# Patient Record
Sex: FEMALE
[De-identification: names, ages, dates, MRNs, and addresses within clinical notes are randomized per-mention and may not be internally consistent; named-entity substitution may affect disease eponyms.]

---

## 2017-01-27 ENCOUNTER — APPOINTMENT (OUTPATIENT)
Dept: OTOLARYNGOLOGY | Facility: CLINIC | Age: 57
End: 2017-01-27

## 2017-04-05 ENCOUNTER — APPOINTMENT (OUTPATIENT)
Dept: OTOLARYNGOLOGY | Facility: CLINIC | Age: 57
End: 2017-04-05

## 2017-04-24 ENCOUNTER — APPOINTMENT (OUTPATIENT)
Dept: OTOLARYNGOLOGY | Facility: CLINIC | Age: 57
End: 2017-04-24

## 2017-04-24 VITALS — DIASTOLIC BLOOD PRESSURE: 105 MMHG | HEART RATE: 135 BPM | SYSTOLIC BLOOD PRESSURE: 136 MMHG | OXYGEN SATURATION: 91 %

## 2017-04-24 DIAGNOSIS — K13.0 DISEASES OF LIPS: ICD-10-CM

## 2017-04-24 RX ORDER — FLUTICASONE PROPIONATE 50 UG/1
50 SPRAY, METERED NASAL DAILY
Qty: 1 | Refills: 6 | Status: ACTIVE | COMMUNITY
Start: 2017-04-24 | End: 1900-01-01

## 2017-04-24 RX ORDER — MUPIROCIN 20 MG/G
2 OINTMENT TOPICAL 3 TIMES DAILY
Qty: 1 | Refills: 3 | Status: ACTIVE | COMMUNITY
Start: 2017-04-24 | End: 1900-01-01

## 2017-07-20 ENCOUNTER — RESULT REVIEW (OUTPATIENT)
Age: 57
End: 2017-07-20

## 2017-07-21 ENCOUNTER — OUTPATIENT (OUTPATIENT)
Dept: OUTPATIENT SERVICES | Facility: HOSPITAL | Age: 57
LOS: 1 days | End: 2017-07-21
Payer: COMMERCIAL

## 2017-07-21 DIAGNOSIS — N64.81 PTOSIS OF BREAST: ICD-10-CM

## 2017-07-21 PROCEDURE — 88305 TISSUE EXAM BY PATHOLOGIST: CPT

## 2017-07-27 LAB — SURGICAL PATHOLOGY STUDY: SIGNIFICANT CHANGE UP

## 2017-10-12 ENCOUNTER — OUTPATIENT (OUTPATIENT)
Dept: OUTPATIENT SERVICES | Facility: HOSPITAL | Age: 57
LOS: 1 days | End: 2017-10-12
Payer: COMMERCIAL

## 2017-10-12 DIAGNOSIS — Z01.812 ENCOUNTER FOR PREPROCEDURAL LABORATORY EXAMINATION: ICD-10-CM

## 2017-10-12 LAB
ANION GAP SERPL CALC-SCNC: 15 MMOL/L — SIGNIFICANT CHANGE UP (ref 5–17)
BASOPHILS NFR BLD AUTO: 0.1 % — SIGNIFICANT CHANGE UP (ref 0–2)
BUN SERPL-MCNC: 7 MG/DL — SIGNIFICANT CHANGE UP (ref 7–23)
CALCIUM SERPL-MCNC: 8.7 MG/DL — SIGNIFICANT CHANGE UP (ref 8.4–10.5)
CHLORIDE SERPL-SCNC: 105 MMOL/L — SIGNIFICANT CHANGE UP (ref 96–108)
CO2 SERPL-SCNC: 23 MMOL/L — SIGNIFICANT CHANGE UP (ref 22–31)
CREAT SERPL-MCNC: 0.54 MG/DL — SIGNIFICANT CHANGE UP (ref 0.5–1.3)
GLUCOSE SERPL-MCNC: 99 MG/DL — SIGNIFICANT CHANGE UP (ref 70–99)
HCT VFR BLD CALC: 38.9 % — SIGNIFICANT CHANGE UP (ref 34.5–45)
HGB BLD-MCNC: 13 G/DL — SIGNIFICANT CHANGE UP (ref 11.5–15.5)
LYMPHOCYTES # BLD AUTO: 9.7 % — LOW (ref 13–44)
MCHC RBC-ENTMCNC: 29.6 PG — SIGNIFICANT CHANGE UP (ref 27–34)
MCHC RBC-ENTMCNC: 33.4 G/DL — SIGNIFICANT CHANGE UP (ref 32–36)
MCV RBC AUTO: 88.6 FL — SIGNIFICANT CHANGE UP (ref 80–100)
MONOCYTES NFR BLD AUTO: 8.6 % — SIGNIFICANT CHANGE UP (ref 2–14)
NEUTROPHILS NFR BLD AUTO: 81.6 % — HIGH (ref 43–77)
PLATELET # BLD AUTO: 256 K/UL — SIGNIFICANT CHANGE UP (ref 150–400)
POTASSIUM SERPL-MCNC: 4.2 MMOL/L — SIGNIFICANT CHANGE UP (ref 3.5–5.3)
POTASSIUM SERPL-SCNC: 4.2 MMOL/L — SIGNIFICANT CHANGE UP (ref 3.5–5.3)
RBC # BLD: 4.39 M/UL — SIGNIFICANT CHANGE UP (ref 3.8–5.2)
RBC # FLD: 12.6 % — SIGNIFICANT CHANGE UP (ref 10.3–16.9)
SODIUM SERPL-SCNC: 143 MMOL/L — SIGNIFICANT CHANGE UP (ref 135–145)
WBC # BLD: 16.6 K/UL — HIGH (ref 3.8–10.5)
WBC # FLD AUTO: 16.6 K/UL — HIGH (ref 3.8–10.5)

## 2017-10-12 PROCEDURE — 85025 COMPLETE CBC W/AUTO DIFF WBC: CPT

## 2017-10-12 PROCEDURE — 80048 BASIC METABOLIC PNL TOTAL CA: CPT

## 2018-04-30 ENCOUNTER — APPOINTMENT (OUTPATIENT)
Dept: OTOLARYNGOLOGY | Facility: CLINIC | Age: 58
End: 2018-04-30
Payer: COMMERCIAL

## 2018-04-30 PROCEDURE — 99213 OFFICE O/P EST LOW 20 MIN: CPT | Mod: 25

## 2018-04-30 PROCEDURE — 69210 REMOVE IMPACTED EAR WAX UNI: CPT

## 2018-06-21 ENCOUNTER — APPOINTMENT (OUTPATIENT)
Dept: OTOLARYNGOLOGY | Facility: CLINIC | Age: 58
End: 2018-06-21

## 2020-02-19 ENCOUNTER — INPATIENT (INPATIENT)
Facility: HOSPITAL | Age: 60
LOS: 0 days | Discharge: HOME | DRG: 446 | End: 2020-02-20
Attending: SURGERY | Admitting: SURGERY
Payer: COMMERCIAL

## 2020-02-19 VITALS
RESPIRATION RATE: 17 BRPM | DIASTOLIC BLOOD PRESSURE: 87 MMHG | SYSTOLIC BLOOD PRESSURE: 122 MMHG | TEMPERATURE: 97 F | HEART RATE: 110 BPM | HEIGHT: 66 IN | WEIGHT: 119.05 LBS | OXYGEN SATURATION: 96 %

## 2020-02-19 LAB
ALBUMIN SERPL ELPH-MCNC: 4.1 G/DL — SIGNIFICANT CHANGE UP (ref 3.3–5)
ALP SERPL-CCNC: 74 U/L — SIGNIFICANT CHANGE UP (ref 40–120)
ALT FLD-CCNC: 16 U/L — SIGNIFICANT CHANGE UP (ref 10–45)
ANION GAP SERPL CALC-SCNC: 14 MMOL/L — SIGNIFICANT CHANGE UP (ref 5–17)
APTT BLD: 29.8 SEC — SIGNIFICANT CHANGE UP (ref 27.5–36.3)
AST SERPL-CCNC: 18 U/L — SIGNIFICANT CHANGE UP (ref 10–40)
BASOPHILS # BLD AUTO: 0.06 K/UL — SIGNIFICANT CHANGE UP (ref 0–0.2)
BASOPHILS # BLD AUTO: 0.07 K/UL — SIGNIFICANT CHANGE UP (ref 0–0.2)
BASOPHILS NFR BLD AUTO: 0.4 % — SIGNIFICANT CHANGE UP (ref 0–2)
BASOPHILS NFR BLD AUTO: 0.5 % — SIGNIFICANT CHANGE UP (ref 0–2)
BILIRUB SERPL-MCNC: 0.5 MG/DL — SIGNIFICANT CHANGE UP (ref 0.2–1.2)
BUN SERPL-MCNC: 16 MG/DL — SIGNIFICANT CHANGE UP (ref 7–23)
CALCIUM SERPL-MCNC: 8.6 MG/DL — SIGNIFICANT CHANGE UP (ref 8.4–10.5)
CHLORIDE SERPL-SCNC: 99 MMOL/L — SIGNIFICANT CHANGE UP (ref 96–108)
CO2 SERPL-SCNC: 23 MMOL/L — SIGNIFICANT CHANGE UP (ref 22–31)
CREAT SERPL-MCNC: 0.79 MG/DL — SIGNIFICANT CHANGE UP (ref 0.5–1.3)
EOSINOPHIL # BLD AUTO: 0.38 K/UL — SIGNIFICANT CHANGE UP (ref 0–0.5)
EOSINOPHIL # BLD AUTO: 0.5 K/UL — SIGNIFICANT CHANGE UP (ref 0–0.5)
EOSINOPHIL NFR BLD AUTO: 2.3 % — SIGNIFICANT CHANGE UP (ref 0–6)
EOSINOPHIL NFR BLD AUTO: 3.3 % — SIGNIFICANT CHANGE UP (ref 0–6)
GLUCOSE SERPL-MCNC: 124 MG/DL — HIGH (ref 70–99)
HCT VFR BLD CALC: 41.5 % — SIGNIFICANT CHANGE UP (ref 34.5–45)
HCT VFR BLD CALC: 48.2 % — HIGH (ref 34.5–45)
HGB BLD-MCNC: 14.4 G/DL — SIGNIFICANT CHANGE UP (ref 11.5–15.5)
HGB BLD-MCNC: 16.8 G/DL — HIGH (ref 11.5–15.5)
IMM GRANULOCYTES NFR BLD AUTO: 0.4 % — SIGNIFICANT CHANGE UP (ref 0–1.5)
IMM GRANULOCYTES NFR BLD AUTO: 0.5 % — SIGNIFICANT CHANGE UP (ref 0–1.5)
INR BLD: 1.04 — SIGNIFICANT CHANGE UP (ref 0.88–1.16)
LIDOCAIN IGE QN: 35 U/L — SIGNIFICANT CHANGE UP (ref 7–60)
LYMPHOCYTES # BLD AUTO: 14 % — SIGNIFICANT CHANGE UP (ref 13–44)
LYMPHOCYTES # BLD AUTO: 2.34 K/UL — SIGNIFICANT CHANGE UP (ref 1–3.3)
LYMPHOCYTES # BLD AUTO: 21.8 % — SIGNIFICANT CHANGE UP (ref 13–44)
LYMPHOCYTES # BLD AUTO: 3.35 K/UL — HIGH (ref 1–3.3)
MCHC RBC-ENTMCNC: 30.3 PG — SIGNIFICANT CHANGE UP (ref 27–34)
MCHC RBC-ENTMCNC: 30.7 PG — SIGNIFICANT CHANGE UP (ref 27–34)
MCHC RBC-ENTMCNC: 34.7 GM/DL — SIGNIFICANT CHANGE UP (ref 32–36)
MCHC RBC-ENTMCNC: 34.9 GM/DL — SIGNIFICANT CHANGE UP (ref 32–36)
MCV RBC AUTO: 87.4 FL — SIGNIFICANT CHANGE UP (ref 80–100)
MCV RBC AUTO: 88.1 FL — SIGNIFICANT CHANGE UP (ref 80–100)
MONOCYTES # BLD AUTO: 1.14 K/UL — HIGH (ref 0–0.9)
MONOCYTES # BLD AUTO: 1.18 K/UL — HIGH (ref 0–0.9)
MONOCYTES NFR BLD AUTO: 7.1 % — SIGNIFICANT CHANGE UP (ref 2–14)
MONOCYTES NFR BLD AUTO: 7.4 % — SIGNIFICANT CHANGE UP (ref 2–14)
NEUTROPHILS # BLD AUTO: 10.25 K/UL — HIGH (ref 1.8–7.4)
NEUTROPHILS # BLD AUTO: 12.7 K/UL — HIGH (ref 1.8–7.4)
NEUTROPHILS NFR BLD AUTO: 66.5 % — SIGNIFICANT CHANGE UP (ref 43–77)
NEUTROPHILS NFR BLD AUTO: 75.8 % — SIGNIFICANT CHANGE UP (ref 43–77)
NRBC # BLD: 0 /100 WBCS — SIGNIFICANT CHANGE UP (ref 0–0)
NRBC # BLD: 0 /100 WBCS — SIGNIFICANT CHANGE UP (ref 0–0)
PLATELET # BLD AUTO: 338 K/UL — SIGNIFICANT CHANGE UP (ref 150–400)
PLATELET # BLD AUTO: 424 K/UL — HIGH (ref 150–400)
POTASSIUM SERPL-MCNC: 4.4 MMOL/L — SIGNIFICANT CHANGE UP (ref 3.5–5.3)
POTASSIUM SERPL-SCNC: 4.4 MMOL/L — SIGNIFICANT CHANGE UP (ref 3.5–5.3)
PROT SERPL-MCNC: 6.9 G/DL — SIGNIFICANT CHANGE UP (ref 6–8.3)
PROTHROM AB SERPL-ACNC: 11.9 SEC — SIGNIFICANT CHANGE UP (ref 10–12.9)
RBC # BLD: 4.75 M/UL — SIGNIFICANT CHANGE UP (ref 3.8–5.2)
RBC # BLD: 5.47 M/UL — HIGH (ref 3.8–5.2)
RBC # FLD: 12.5 % — SIGNIFICANT CHANGE UP (ref 10.3–14.5)
RBC # FLD: 12.5 % — SIGNIFICANT CHANGE UP (ref 10.3–14.5)
SODIUM SERPL-SCNC: 136 MMOL/L — SIGNIFICANT CHANGE UP (ref 135–145)
WBC # BLD: 15.48 K/UL — HIGH (ref 3.8–10.5)
WBC # BLD: 16.73 K/UL — HIGH (ref 3.8–10.5)
WBC # FLD AUTO: 15.48 K/UL — HIGH (ref 3.8–10.5)
WBC # FLD AUTO: 16.73 K/UL — HIGH (ref 3.8–10.5)

## 2020-02-19 PROCEDURE — 99285 EMERGENCY DEPT VISIT HI MDM: CPT

## 2020-02-19 PROCEDURE — 74177 CT ABD & PELVIS W/CONTRAST: CPT | Mod: 26

## 2020-02-19 PROCEDURE — 76705 ECHO EXAM OF ABDOMEN: CPT | Mod: 26

## 2020-02-19 RX ORDER — ENOXAPARIN SODIUM 100 MG/ML
40 INJECTION SUBCUTANEOUS EVERY 24 HOURS
Refills: 0 | Status: DISCONTINUED | OUTPATIENT
Start: 2020-02-19 | End: 2020-02-20

## 2020-02-19 RX ORDER — METRONIDAZOLE 500 MG
500 TABLET ORAL ONCE
Refills: 0 | Status: COMPLETED | OUTPATIENT
Start: 2020-02-19 | End: 2020-02-19

## 2020-02-19 RX ORDER — METRONIDAZOLE 500 MG
TABLET ORAL
Refills: 0 | Status: DISCONTINUED | OUTPATIENT
Start: 2020-02-19 | End: 2020-02-20

## 2020-02-19 RX ORDER — SODIUM CHLORIDE 9 MG/ML
1000 INJECTION INTRAMUSCULAR; INTRAVENOUS; SUBCUTANEOUS ONCE
Refills: 0 | Status: COMPLETED | OUTPATIENT
Start: 2020-02-19 | End: 2020-02-19

## 2020-02-19 RX ORDER — SUMATRIPTAN SUCCINATE 4 MG/.5ML
50 INJECTION, SOLUTION SUBCUTANEOUS DAILY
Refills: 0 | Status: DISCONTINUED | OUTPATIENT
Start: 2020-02-19 | End: 2020-02-20

## 2020-02-19 RX ORDER — HYDROMORPHONE HYDROCHLORIDE 2 MG/ML
1 INJECTION INTRAMUSCULAR; INTRAVENOUS; SUBCUTANEOUS ONCE
Refills: 0 | Status: DISCONTINUED | OUTPATIENT
Start: 2020-02-19 | End: 2020-02-19

## 2020-02-19 RX ORDER — ONDANSETRON 8 MG/1
4 TABLET, FILM COATED ORAL ONCE
Refills: 0 | Status: COMPLETED | OUTPATIENT
Start: 2020-02-19 | End: 2020-02-19

## 2020-02-19 RX ORDER — METRONIDAZOLE 500 MG
500 TABLET ORAL EVERY 8 HOURS
Refills: 0 | Status: DISCONTINUED | OUTPATIENT
Start: 2020-02-20 | End: 2020-02-20

## 2020-02-19 RX ORDER — INFLUENZA VIRUS VACCINE 15; 15; 15; 15 UG/.5ML; UG/.5ML; UG/.5ML; UG/.5ML
0.5 SUSPENSION INTRAMUSCULAR ONCE
Refills: 0 | Status: DISCONTINUED | OUTPATIENT
Start: 2020-02-19 | End: 2020-02-20

## 2020-02-19 RX ORDER — IOHEXOL 300 MG/ML
30 INJECTION, SOLUTION INTRAVENOUS ONCE
Refills: 0 | Status: COMPLETED | OUTPATIENT
Start: 2020-02-19 | End: 2020-02-19

## 2020-02-19 RX ORDER — SODIUM CHLORIDE 9 MG/ML
1000 INJECTION, SOLUTION INTRAVENOUS
Refills: 0 | Status: DISCONTINUED | OUTPATIENT
Start: 2020-02-19 | End: 2020-02-20

## 2020-02-19 RX ORDER — CEFTRIAXONE 500 MG/1
1000 INJECTION, POWDER, FOR SOLUTION INTRAMUSCULAR; INTRAVENOUS EVERY 24 HOURS
Refills: 0 | Status: DISCONTINUED | OUTPATIENT
Start: 2020-02-19 | End: 2020-02-20

## 2020-02-19 RX ORDER — METOCLOPRAMIDE HCL 10 MG
10 TABLET ORAL ONCE
Refills: 0 | Status: COMPLETED | OUTPATIENT
Start: 2020-02-19 | End: 2020-02-19

## 2020-02-19 RX ORDER — BUPROPION HYDROCHLORIDE 150 MG/1
150 TABLET, EXTENDED RELEASE ORAL DAILY
Refills: 0 | Status: DISCONTINUED | OUTPATIENT
Start: 2020-02-19 | End: 2020-02-20

## 2020-02-19 RX ORDER — SODIUM CHLORIDE 9 MG/ML
1000 INJECTION INTRAMUSCULAR; INTRAVENOUS; SUBCUTANEOUS
Refills: 0 | Status: DISCONTINUED | OUTPATIENT
Start: 2020-02-19 | End: 2020-02-19

## 2020-02-19 RX ADMIN — SUMATRIPTAN SUCCINATE 50 MILLIGRAM(S): 4 INJECTION, SOLUTION SUBCUTANEOUS at 23:59

## 2020-02-19 RX ADMIN — HYDROMORPHONE HYDROCHLORIDE 1 MILLIGRAM(S): 2 INJECTION INTRAMUSCULAR; INTRAVENOUS; SUBCUTANEOUS at 16:14

## 2020-02-19 RX ADMIN — SODIUM CHLORIDE 1000 MILLILITER(S): 9 INJECTION INTRAMUSCULAR; INTRAVENOUS; SUBCUTANEOUS at 17:17

## 2020-02-19 RX ADMIN — ONDANSETRON 4 MILLIGRAM(S): 8 TABLET, FILM COATED ORAL at 10:50

## 2020-02-19 RX ADMIN — HYDROMORPHONE HYDROCHLORIDE 1 MILLIGRAM(S): 2 INJECTION INTRAMUSCULAR; INTRAVENOUS; SUBCUTANEOUS at 15:13

## 2020-02-19 RX ADMIN — HYDROMORPHONE HYDROCHLORIDE 1 MILLIGRAM(S): 2 INJECTION INTRAMUSCULAR; INTRAVENOUS; SUBCUTANEOUS at 10:50

## 2020-02-19 RX ADMIN — BUPROPION HYDROCHLORIDE 150 MILLIGRAM(S): 150 TABLET, EXTENDED RELEASE ORAL at 23:53

## 2020-02-19 RX ADMIN — SODIUM CHLORIDE 1000 MILLILITER(S): 9 INJECTION INTRAMUSCULAR; INTRAVENOUS; SUBCUTANEOUS at 14:52

## 2020-02-19 RX ADMIN — SODIUM CHLORIDE 100 MILLILITER(S): 9 INJECTION, SOLUTION INTRAVENOUS at 17:43

## 2020-02-19 RX ADMIN — Medication 104 MILLIGRAM(S): at 15:11

## 2020-02-19 RX ADMIN — HYDROMORPHONE HYDROCHLORIDE 1 MILLIGRAM(S): 2 INJECTION INTRAMUSCULAR; INTRAVENOUS; SUBCUTANEOUS at 16:34

## 2020-02-19 RX ADMIN — HYDROMORPHONE HYDROCHLORIDE 1 MILLIGRAM(S): 2 INJECTION INTRAMUSCULAR; INTRAVENOUS; SUBCUTANEOUS at 18:21

## 2020-02-19 RX ADMIN — HYDROMORPHONE HYDROCHLORIDE 1 MILLIGRAM(S): 2 INJECTION INTRAMUSCULAR; INTRAVENOUS; SUBCUTANEOUS at 19:31

## 2020-02-19 RX ADMIN — HYDROMORPHONE HYDROCHLORIDE 1 MILLIGRAM(S): 2 INJECTION INTRAMUSCULAR; INTRAVENOUS; SUBCUTANEOUS at 11:20

## 2020-02-19 RX ADMIN — IOHEXOL 30 MILLILITER(S): 300 INJECTION, SOLUTION INTRAVENOUS at 10:49

## 2020-02-19 RX ADMIN — HYDROMORPHONE HYDROCHLORIDE 1 MILLIGRAM(S): 2 INJECTION INTRAMUSCULAR; INTRAVENOUS; SUBCUTANEOUS at 19:22

## 2020-02-19 RX ADMIN — Medication 100 MILLIGRAM(S): at 23:01

## 2020-02-19 RX ADMIN — SUMATRIPTAN SUCCINATE 50 MILLIGRAM(S): 4 INJECTION, SOLUTION SUBCUTANEOUS at 22:59

## 2020-02-19 RX ADMIN — ENOXAPARIN SODIUM 40 MILLIGRAM(S): 100 INJECTION SUBCUTANEOUS at 18:31

## 2020-02-19 RX ADMIN — CEFTRIAXONE 100 MILLIGRAM(S): 500 INJECTION, POWDER, FOR SOLUTION INTRAMUSCULAR; INTRAVENOUS at 23:51

## 2020-02-19 NOTE — ED ADULT NURSE REASSESSMENT NOTE - NS ED NURSE REASSESS COMMENT FT1
pt refusing lactate draw as she does not want another blood draw. md mckeon aware, not needed at this time. no new orders.

## 2020-02-19 NOTE — ED PROVIDER NOTE - OBJECTIVE STATEMENT
60 y/o F with PMHx chronic constipation and chronic neck pain, on oxycodone, BIBA, reports several days of worsening abdominal pain, lack of appetite, constipation and nausea; no associated urinary complaints or vomiting. Pt reports normal colonoscopy 4 years ago and had a colonic, which she regularly has, 1 week ago.

## 2020-02-19 NOTE — H&P ADULT - HISTORY OF PRESENT ILLNESS
HPI: 59 F PMH breast ca s/p mastectomy and chemoRT, s/p TRAM flap (20yr ago), hx of cholelithiasis and nephrolithiasis managed medically w/ Bear Bile, presenting with two days of abdominal pain, nausea and emesis. Pt reports having a "bad hamburger" 3 days ago after which she noticed moderate achey intermittent periumbilical pain, lasting a few min to hrs and self-resolving, associated with mild nausea and one episode of small bilious emesis yesterday. Pt reports symptoms improved significantly after emesis but restarted overnight, therefore prompting pt to return to ED. She reports pain feels improved today and described as 6/10 achey umbilical pain which last for 15-20 min and occurring every hour. Denies fever/chills, denies chest pain/dyspnea, does not recall last time she passed flatus, last BM 2 days ago, reports previously having one BM daily. Voiding well without dysuria/pyuria. Denies personal/fam hx of IBD, last cscope/EGD 4 yrs ago and both normal per pt.     PMH: as above  PSH: as above  Meds: Metoprolol daily (pt unsure of dose, describes taking it for hx of palpitations in past), Oxy 15 mg 4x daily for chronic R sided neck pain  Social: Denies smoking, denies etoh use, denies illicit drug use, works on [employer]  Allergies: shellfish

## 2020-02-19 NOTE — H&P ADULT - ASSESSMENT
A/P: 59 F PMH breast ca s/p mastectomy and chemoRT, s/p TRAM flap (20yr ago), hx of cholelithiasis and nephrolithiasis managed medically w/ Bear Bile, presenting with two days of abdominal pain, nausea and emesis w/ GB distention and possible SBO on CT scan.    Admit to Dr. Cordova, regional  NPO/IVF  Repeat CBC at 6 pm after IV hydration, will reassess need for IV Ab at that time  Lovenox/SCDs for DVT ppx  Am labs  Discussed w/chief resident and Dr. Cordova A/P: 59 F PMH breast ca s/p mastectomy and chemoRT, s/p TRAM flap (20yr ago), hx of cholelithiasis and nephrolithiasis managed medically w/ Bear Bile, presenting with two days of abdominal pain, nausea and emesis w/ GB distention and possible ileus vs SBO on CT scan.    Admit to Dr. Cordova, regional  NPO/IVF  Repeat CBC at 6 pm after IV hydration, will reassess need for IV Ab at that time  Lovenox/SCDs for DVT ppx  Am labs  Discussed w/chief resident and Dr. Cordova

## 2020-02-19 NOTE — H&P ADULT - DOES THIS PATIENT HAVE A HISTORY OF OR HAS BEEN DX WITH HEART FAILURE?
Telephone Encounter by Maury Dawson RN, BSN at 05/09/17 11:31 AM     Author:  Maury Dawson RN, BSN Service:  (none) Author Type:  Registered Nurse     Filed:  05/09/17 11:31 AM Encounter Date:  5/8/2017 Status:  Signed     :  Maury Dawson RN, BSN (Registered Nurse)            Rx sent to pharmacy.  Patient already notified of labs via [employer] and lab orders previously placed[JH1.1M]      Revision History        User Key Date/Time User Provider Type Action    > JH1.1 05/09/17 11:31 AM Maury Dawson RN, BSN Registered Nurse Sign    M - Manual no

## 2020-02-19 NOTE — ED PROVIDER NOTE - PROGRESS NOTE DETAILS
will hold on antibiotics for possible cholecystitis at this time as per surgery recommendations, if suspician increases later they will start

## 2020-02-19 NOTE — CHART NOTE - NSCHARTNOTEFT_GEN_A_CORE
serial abdominal exam   patient complains of continued abdominal pain. IV pain medication given 3 hours ago helped the pain. pain rated 6/10, no associated factors with the pain. nothing makes pain worse, pain medication makes it better. denies nausea/ vomiting, +flatus, no BM  Physical exam:   soft, TTP in RUQ and RLQ, negative murphys sign

## 2020-02-19 NOTE — ED ADULT NURSE NOTE - ABDOMEN
tender/firm/nondistended
no abdominal pain, no bloating, no constipation, no diarrhea, no nausea and no vomiting.

## 2020-02-19 NOTE — ED ADULT NURSE NOTE - CAS TRG GENERAL NORM CIRC DET
Please schedule patient for Annual and Labs (CMP/LIPID)    Please also check with your provider if any further labs need to be added and scheduled together.    Thanks !   Strong peripheral pulses

## 2020-02-19 NOTE — ED PROVIDER NOTE - MUSCULOSKELETAL, MLM
How Severe Is Your Condition?: mild Spine appears normal, range of motion is not limited, no muscle or joint tenderness

## 2020-02-19 NOTE — H&P ADULT - NSHPLABSRESULTS_GEN_ALL_CORE
16.8   16.73 )-----------( 424      ( 19 Feb 2020 10:31 )             48.2     02-19    136  |  99  |  16  ----------------------------<  124<H>  4.4   |  23  |  0.79    Ca    8.6      19 Feb 2020 10:31    TPro  6.9  /  Alb  4.1  /  TBili  0.5  /  DBili  x   /  AST  18  /  ALT  16  /  AlkPhos  74  02-19    < from: CT Abdomen and Pelvis w/ Oral Cont and w/ IV Cont (02.19.20 @ 13:24) >    FINDINGS:    Lower chest: There are a few micronodules in the right lower lobe. Right breast implant noted.    Liver: Smooth contour. No focal lesion.    Biliary system: Gallbladder is distended and contains multiple gallstones. No wall thickening or pericholecystic fluid. No biliary ductal dilatation. Pancreas: Unremarkable.    Spleen: Unremarkable.    Adrenal glands: Unremarkable.    Kidneys: Symmetric parenchymal enhancement. Subcentimeter hypodensities in the right kidney are too small to characterize. No hydronephrosis. No renal or ureteral stone.     Bowel/Peritoneum: Mildly dilated proximal small bowel measuring up to 3.0 cm. Distal small bowel is collapsed. Questionable transition point in the mid abdomen (series 3 image 68) but no CT visible mass. Normal appendix. No extraluminal gas. Trace perihepatic and pelvic ascites. Small fat-containing umbilical hernia. Status post right inguinal hernia repair.    Pelvic organs: The uterus is enlarged, retroverted, and contains multiple fibroids the largest right fundal pedunculated measuring 4.7 x 4.9 cm. There is a left adnexal cyst measuring 3.9 x 3.6 cm.    Lymph nodes: No lymphadenopathy.    Vascular: Normal caliber aorta.    Bones/Soft tissues: There is dextrocurvature ofthe lumbar spine. Moderate to severe disc space narrowing L5-S1.      IMPRESSION:     Low-grade mid small bowel obstruction, ileus in the differential.     Distended gallbladder with multiple gallstones. No CT evidence of acute cholecystitis, howeverfollow-up clinically.    Incidental 3.9 cm left ovarian cyst which may be followed with pelvic sonogram.            Thank you for the opportunity to participate in the care of this patient.    < end of copied text >    < from: US Abdomen Limited (02.19.20 @ 15:55) >        INTERPRETATION:    Right upper quadrant ultrasound    History: Abdominal pain.    Prior studies: CT scan of abdomen and pelvis from 2/19/2020.    Findings: The liver is normal in size and echogenicity. There are no focal hepatic lesions. There is no intrahepatic biliary ductal dilatation. The common duct is normal in caliber, measuring 0.5 cm. Multiple gallstones are again noted within a distended gallbladder. The gallbladder wall is mildly thickened, measuring 0.32 cm. Pericholecystic fluid is present. There is a negative sonographic Caba's sign. The pancreas is unable to be visualized due to overlying bowel gas. The right kidney is normal in size, measuring 9.1 cm in length. There is normal right renal parenchymal thickness and echogenicity. There is no right hydronephrosis. There is no ascites in the right upper quadrant. The proximal portions of the aorta and inferior vena cava are not well-visualized due to overlying bowel gas.    Impression: As on the CT scan from 2/19/2020, there are multiple gallstones within a distended gallbladder. The gallbladder wall is mildly thickened and pericholecystic fluid is present. These findings are suspicious for acute cholecystitis. However, given that the sonographic Caba's sign is negative, further evaluation with HIDA scan is recommended.              Thank you for the opportunity to participate in the care of this patient.        ALIYA KAY M.D., ATTENDING RADIOLOGIST  This document has been electronically signed. Feb 19 2020  4:05PM        < end of copied text >

## 2020-02-19 NOTE — ED PROVIDER NOTE - CLINICAL SUMMARY MEDICAL DECISION MAKING FREE TEXT BOX
60 y/o F with abdominal pain, nausea, and constipation. Consider SBO, consider colitis, consider complication from colonic, intraabdominal abscess and perforation. Plan for labs, UA, CT, EKG, and pain control. Dispo pending further workup and reevaluation.

## 2020-02-19 NOTE — H&P ADULT - NSHPPHYSICALEXAM_GEN_ALL_CORE
Vital Signs Last 24 Hrs  T(C): 36.9 (19 Feb 2020 16:22), Max: 36.9 (19 Feb 2020 11:15)  T(F): 98.4 (19 Feb 2020 16:22), Max: 98.4 (19 Feb 2020 11:15)  HR: 97 (19 Feb 2020 16:22) (97 - 121)  BP: 126/73 (19 Feb 2020 16:22) (118/76 - 126/73)  BP(mean): --  RR: 18 (19 Feb 2020 16:22) (17 - 18)  SpO2: 98% (19 Feb 2020 16:22) (95% - 98%)    General: NAD, resting comfortably  Neuro: AAOX3, EOMI, PERRLA, no scleral icterus  Pulm: CTAB, Nonlabored breathing  CV: S1/S2 normal, no murmurs  Abdomen: soft, ND, mild LLQ and periumbilical pain, no rebound, no guarding  Extremities: WWP, No LE edema b/l

## 2020-02-19 NOTE — ED ADULT NURSE NOTE - OBJECTIVE STATEMENT
pt a&ox3 resting comfortably in stretcher. pt reports abdominal pain x 3 days. last bm 2 days ago. abdomen non distended, firm, tender to deep palpation. pt reports oxycodone use for chronic neck pain. reports nausea and 1 episode of emesis. pt unable to tolerate food or drink at home. denies cp, sob, fevers. abdominal pain worsened and pt came to ed for eval.

## 2020-02-19 NOTE — ED ADULT TRIAGE NOTE - CHIEF COMPLAINT QUOTE
Patient complaining of two days of abdominal pain, constipation, bloating and N/V.  Patient denies any other complaints at this time.  EKG in progress.

## 2020-02-20 ENCOUNTER — TRANSCRIPTION ENCOUNTER (OUTPATIENT)
Age: 60
End: 2020-02-20

## 2020-02-20 VITALS
DIASTOLIC BLOOD PRESSURE: 82 MMHG | TEMPERATURE: 98 F | OXYGEN SATURATION: 96 % | HEART RATE: 87 BPM | RESPIRATION RATE: 17 BRPM | SYSTOLIC BLOOD PRESSURE: 127 MMHG

## 2020-02-20 LAB
ALBUMIN SERPL ELPH-MCNC: 3.4 G/DL — SIGNIFICANT CHANGE UP (ref 3.3–5)
ALP SERPL-CCNC: 61 U/L — SIGNIFICANT CHANGE UP (ref 40–120)
ALT FLD-CCNC: 15 U/L — SIGNIFICANT CHANGE UP (ref 10–45)
ANION GAP SERPL CALC-SCNC: 13 MMOL/L — SIGNIFICANT CHANGE UP (ref 5–17)
AST SERPL-CCNC: 15 U/L — SIGNIFICANT CHANGE UP (ref 10–40)
BILIRUB SERPL-MCNC: 0.4 MG/DL — SIGNIFICANT CHANGE UP (ref 0.2–1.2)
BUN SERPL-MCNC: 11 MG/DL — SIGNIFICANT CHANGE UP (ref 7–23)
CALCIUM SERPL-MCNC: 7.5 MG/DL — LOW (ref 8.4–10.5)
CHLORIDE SERPL-SCNC: 106 MMOL/L — SIGNIFICANT CHANGE UP (ref 96–108)
CO2 SERPL-SCNC: 21 MMOL/L — LOW (ref 22–31)
CREAT SERPL-MCNC: 0.69 MG/DL — SIGNIFICANT CHANGE UP (ref 0.5–1.3)
GLUCOSE SERPL-MCNC: 89 MG/DL — SIGNIFICANT CHANGE UP (ref 70–99)
HCT VFR BLD CALC: 40.5 % — SIGNIFICANT CHANGE UP (ref 34.5–45)
HCV AB S/CO SERPL IA: 0.12 S/CO — SIGNIFICANT CHANGE UP
HCV AB SERPL-IMP: SIGNIFICANT CHANGE UP
HGB BLD-MCNC: 14.1 G/DL — SIGNIFICANT CHANGE UP (ref 11.5–15.5)
MAGNESIUM SERPL-MCNC: 2.3 MG/DL — SIGNIFICANT CHANGE UP (ref 1.6–2.6)
MCHC RBC-ENTMCNC: 30.9 PG — SIGNIFICANT CHANGE UP (ref 27–34)
MCHC RBC-ENTMCNC: 34.8 GM/DL — SIGNIFICANT CHANGE UP (ref 32–36)
MCV RBC AUTO: 88.8 FL — SIGNIFICANT CHANGE UP (ref 80–100)
NRBC # BLD: 0 /100 WBCS — SIGNIFICANT CHANGE UP (ref 0–0)
PHOSPHATE SERPL-MCNC: 2.3 MG/DL — LOW (ref 2.5–4.5)
PLATELET # BLD AUTO: 287 K/UL — SIGNIFICANT CHANGE UP (ref 150–400)
POTASSIUM SERPL-MCNC: 4.2 MMOL/L — SIGNIFICANT CHANGE UP (ref 3.5–5.3)
POTASSIUM SERPL-SCNC: 4.2 MMOL/L — SIGNIFICANT CHANGE UP (ref 3.5–5.3)
PROT SERPL-MCNC: 6 G/DL — SIGNIFICANT CHANGE UP (ref 6–8.3)
RBC # BLD: 4.56 M/UL — SIGNIFICANT CHANGE UP (ref 3.8–5.2)
RBC # FLD: 12.7 % — SIGNIFICANT CHANGE UP (ref 10.3–14.5)
SODIUM SERPL-SCNC: 140 MMOL/L — SIGNIFICANT CHANGE UP (ref 135–145)
WBC # BLD: 9.64 K/UL — SIGNIFICANT CHANGE UP (ref 3.8–10.5)
WBC # FLD AUTO: 9.64 K/UL — SIGNIFICANT CHANGE UP (ref 3.8–10.5)

## 2020-02-20 PROCEDURE — 83735 ASSAY OF MAGNESIUM: CPT

## 2020-02-20 PROCEDURE — 86803 HEPATITIS C AB TEST: CPT

## 2020-02-20 PROCEDURE — 74177 CT ABD & PELVIS W/CONTRAST: CPT

## 2020-02-20 PROCEDURE — 96374 THER/PROPH/DIAG INJ IV PUSH: CPT | Mod: XU

## 2020-02-20 PROCEDURE — 85610 PROTHROMBIN TIME: CPT

## 2020-02-20 PROCEDURE — 83690 ASSAY OF LIPASE: CPT

## 2020-02-20 PROCEDURE — 84100 ASSAY OF PHOSPHORUS: CPT

## 2020-02-20 PROCEDURE — 85025 COMPLETE CBC W/AUTO DIFF WBC: CPT

## 2020-02-20 PROCEDURE — 36415 COLL VENOUS BLD VENIPUNCTURE: CPT

## 2020-02-20 PROCEDURE — 85027 COMPLETE CBC AUTOMATED: CPT

## 2020-02-20 PROCEDURE — 76705 ECHO EXAM OF ABDOMEN: CPT

## 2020-02-20 PROCEDURE — 99285 EMERGENCY DEPT VISIT HI MDM: CPT | Mod: 25

## 2020-02-20 PROCEDURE — 80053 COMPREHEN METABOLIC PANEL: CPT

## 2020-02-20 PROCEDURE — 96375 TX/PRO/DX INJ NEW DRUG ADDON: CPT

## 2020-02-20 PROCEDURE — 96376 TX/PRO/DX INJ SAME DRUG ADON: CPT

## 2020-02-20 PROCEDURE — 85730 THROMBOPLASTIN TIME PARTIAL: CPT

## 2020-02-20 RX ORDER — ACETAMINOPHEN 500 MG
650 TABLET ORAL ONCE
Refills: 0 | Status: COMPLETED | OUTPATIENT
Start: 2020-02-20 | End: 2020-02-20

## 2020-02-20 RX ORDER — LANOLIN ALCOHOL/MO/W.PET/CERES
3 CREAM (GRAM) TOPICAL AT BEDTIME
Refills: 0 | Status: DISCONTINUED | OUTPATIENT
Start: 2020-02-20 | End: 2020-02-20

## 2020-02-20 RX ORDER — ACETAMINOPHEN 500 MG
1000 TABLET ORAL ONCE
Refills: 0 | Status: COMPLETED | OUTPATIENT
Start: 2020-02-20 | End: 2020-02-20

## 2020-02-20 RX ORDER — HYDROMORPHONE HYDROCHLORIDE 2 MG/ML
1 INJECTION INTRAMUSCULAR; INTRAVENOUS; SUBCUTANEOUS ONCE
Refills: 0 | Status: DISCONTINUED | OUTPATIENT
Start: 2020-02-20 | End: 2020-02-20

## 2020-02-20 RX ADMIN — SUMATRIPTAN SUCCINATE 50 MILLIGRAM(S): 4 INJECTION, SOLUTION SUBCUTANEOUS at 06:41

## 2020-02-20 RX ADMIN — SUMATRIPTAN SUCCINATE 50 MILLIGRAM(S): 4 INJECTION, SOLUTION SUBCUTANEOUS at 07:41

## 2020-02-20 RX ADMIN — Medication 3 MILLIGRAM(S): at 00:09

## 2020-02-20 RX ADMIN — Medication 85 MILLIMOLE(S): at 10:27

## 2020-02-20 RX ADMIN — Medication 100 MILLIGRAM(S): at 06:39

## 2020-02-20 RX ADMIN — HYDROMORPHONE HYDROCHLORIDE 1 MILLIGRAM(S): 2 INJECTION INTRAMUSCULAR; INTRAVENOUS; SUBCUTANEOUS at 06:30

## 2020-02-20 RX ADMIN — Medication 1000 MILLIGRAM(S): at 01:00

## 2020-02-20 RX ADMIN — Medication 400 MILLIGRAM(S): at 00:09

## 2020-02-20 RX ADMIN — Medication 1000 MILLIGRAM(S): at 06:30

## 2020-02-20 RX ADMIN — Medication 400 MILLIGRAM(S): at 05:42

## 2020-02-20 RX ADMIN — HYDROMORPHONE HYDROCHLORIDE 1 MILLIGRAM(S): 2 INJECTION INTRAMUSCULAR; INTRAVENOUS; SUBCUTANEOUS at 05:56

## 2020-02-20 RX ADMIN — Medication 650 MILLIGRAM(S): at 11:19

## 2020-02-20 NOTE — PROGRESS NOTE ADULT - SUBJECTIVE AND OBJECTIVE BOX
SUBJECTIVE:    Patient seen and examined at bedside. In no acute distress. Denies N/V. Is tolerating diet, passing flatus, pain well controlled.     OBJECTIVE:    Vital Signs Last 24 Hrs  T(C): 36.8 (20 Feb 2020 05:40), Max: 36.9 (19 Feb 2020 11:15)  T(F): 98.2 (20 Feb 2020 05:40), Max: 98.4 (19 Feb 2020 11:15)  HR: 97 (20 Feb 2020 05:40) (91 - 121)  BP: 149/75 (20 Feb 2020 05:40) (115/74 - 149/75)  BP(mean): --  RR: 16 (20 Feb 2020 05:40) (16 - 18)  SpO2: 98% (20 Feb 2020 05:40) (95% - 98%)    I&O's Summary    19 Feb 2020 07:01  -  20 Feb 2020 07:00  --------------------------------------------------------  IN: 1325 mL / OUT: 682 mL / NET: 643 mL    20 Feb 2020 07:01  -  20 Feb 2020 07:35  --------------------------------------------------------  IN: 100 mL / OUT: 0 mL / NET: 100 mL        Physical Exam:  General Appearance: Appears well, NAD  HEENT: Grossly symmetric  Chest: Non labored breathing  CV: Pulse regular presently  Abdomen: Soft, mild-moderate epigastric tenderness w/ minimal RUQ tenderness, nondistended, dressings clean and dry and intact  Extremities: Grossly symmetric, no swelling, warm.     LABS:                        14.4   15.48 )-----------( 338      ( 19 Feb 2020 18:53 )             41.5     02-19    136  |  99  |  16  ----------------------------<  124<H>  4.4   |  23  |  0.79    Ca    8.6      19 Feb 2020 10:31    TPro  6.9  /  Alb  4.1  /  TBili  0.5  /  DBili  x   /  AST  18  /  ALT  16  /  AlkPhos  74  02-19    PT/INR - ( 19 Feb 2020 10:31 )   PT: 11.9 sec;   INR: 1.04          PTT - ( 19 Feb 2020 10:31 )  PTT:29.8 sec      RADIOLOGY & ADDITIONAL STUDIES:

## 2020-02-20 NOTE — DISCHARGE NOTE NURSING/CASE MANAGEMENT/SOCIAL WORK - PATIENT PORTAL LINK FT
You can access the FollowMyHealth Patient Portal offered by [facility] by registering at the following website: [email or web address]. By joining [employer]’s FollowMyHealth portal, you will also be able to view your health information using other applications (apps) compatible with our system.

## 2020-02-20 NOTE — DISCHARGE NOTE PROVIDER - CARE PROVIDER_API CALL
Jitendra Cordova)  Surgery  [address] [place] [address], [address]  [place], [place] [address]  Phone: [phone]  Fax: [phone]  Follow Up Time:

## 2020-02-20 NOTE — PROGRESS NOTE ADULT - ASSESSMENT
A/P: 59 F PMH breast ca s/p mastectomy and chemoRT, s/p TRAM flap (20yr ago), hx of cholelithiasis and nephrolithiasis managed medically w/ Bear Bile, presenting with two days of abdominal pain, nausea and emesis w/ leukocytosis and radiographic findings of mild gallbladder thickening and edema w/ mildly dilated bowel loops, admitted for acute cholecystitis vs gastroenteritis w/ possible chronic cholecystitis.    Will get HIDA today  Patient states that she either way she does not want cholecystectomy at this time.  Agreed that we will revisit this discussion after HIDA results.    NPO/IVF  ceftriaxone/ flagyl  Lovenox/SCDs for DVT ppx  Am labs

## 2020-02-20 NOTE — DISCHARGE NOTE PROVIDER - NSDCFUADDINST_GEN_ALL_CORE_FT
For any temperatures > 101, worsening of pain, chest pain, shortness of breath, leg pain, nausea or vomiting, please call the provided number or visit your nearest emergency room.

## 2020-02-20 NOTE — DISCHARGE NOTE PROVIDER - NSDCFUADDAPPT_GEN_ALL_CORE_FT
We recommend that you schedule a HIDA scan for evaluation of cholecystitis. Should this be positive or you continue to have abdominal pain, nausea, vomiting, please follow up with Dr. Cordova.    Thank you.

## 2020-02-25 DIAGNOSIS — K81.0 ACUTE CHOLECYSTITIS: ICD-10-CM

## 2020-02-25 DIAGNOSIS — K56.609 UNSPECIFIED INTESTINAL OBSTRUCTION, UNSPECIFIED AS TO PARTIAL VERSUS COMPLETE OBSTRUCTION: ICD-10-CM

## 2020-02-25 DIAGNOSIS — Z53.20 PROCEDURE AND TREATMENT NOT CARRIED OUT BECAUSE OF PATIENT'S DECISION FOR UNSPECIFIED REASONS: ICD-10-CM

## 2020-02-25 DIAGNOSIS — Z90.10 ACQUIRED ABSENCE OF UNSPECIFIED BREAST AND NIPPLE: ICD-10-CM

## 2020-02-25 DIAGNOSIS — Z91.013 ALLERGY TO SEAFOOD: ICD-10-CM

## 2020-02-25 DIAGNOSIS — Z92.21 PERSONAL HISTORY OF ANTINEOPLASTIC CHEMOTHERAPY: ICD-10-CM

## 2020-02-25 DIAGNOSIS — Z85.3 PERSONAL HISTORY OF MALIGNANT NEOPLASM OF BREAST: ICD-10-CM

## 2020-02-25 DIAGNOSIS — Z92.3 PERSONAL HISTORY OF IRRADIATION: ICD-10-CM

## 2020-07-30 ENCOUNTER — APPOINTMENT (OUTPATIENT)
Dept: OTOLARYNGOLOGY | Facility: CLINIC | Age: 60
End: 2020-07-30
Payer: COMMERCIAL

## 2020-07-30 DIAGNOSIS — H92.09 OTALGIA, UNSPECIFIED EAR: ICD-10-CM

## 2020-07-30 DIAGNOSIS — G47.33 OBSTRUCTIVE SLEEP APNEA (ADULT) (PEDIATRIC): ICD-10-CM

## 2020-07-30 DIAGNOSIS — J34.89 OTHER SPECIFIED DISORDERS OF NOSE AND NASAL SINUSES: ICD-10-CM

## 2020-07-30 DIAGNOSIS — H61.23 IMPACTED CERUMEN, BILATERAL: ICD-10-CM

## 2020-07-30 PROCEDURE — 69210 REMOVE IMPACTED EAR WAX UNI: CPT

## 2020-07-30 PROCEDURE — 99214 OFFICE O/P EST MOD 30 MIN: CPT | Mod: 25

## 2020-07-30 NOTE — PHYSICAL EXAM
[Normal] : no rashes [de-identified] :  bilateral cerumen impaction. removed [de-identified] : dry and crusting Rx Abx cream [de-identified] : sp botox and fillers facial area\par lumpy area 1x1 cm rt preauricular region\par We will review the scan and official radiological report

## 2020-07-30 NOTE — PROCEDURE
[Congested] : congested [Deviated to the Lt] : deviated to the left [] : Removal of Cerumen [FreeTextEntry5] : Left Otalgia.  TMJ.  Cerumen impaction was removed via operating head otoscope, sims suction # 5 and ear curette

## 2020-07-30 NOTE — HISTORY OF PRESENT ILLNESS
[de-identified] : 52 years old female patient with history of Left ear pain.  Patient is present today in the office with  Left Otalgia. Bilateral Cerumen impaction.   TMJ.   Patient C/O Snoring loud enough to disturb her sleep or that of others.  Deviated Nasal Septum to the left

## 2020-07-30 NOTE — REASON FOR VISIT
[Subsequent Evaluation] : a subsequent evaluation for [FreeTextEntry2] : Left ear pain for the past couple of days.  Patient C/O laud snoring

## 2020-08-26 PROBLEM — M54.2 CERVICALGIA: Chronic | Status: ACTIVE | Noted: 2020-02-19

## 2020-08-26 PROBLEM — K59.09 OTHER CONSTIPATION: Chronic | Status: ACTIVE | Noted: 2020-02-19

## 2020-09-01 ENCOUNTER — APPOINTMENT (OUTPATIENT)
Dept: SLEEP CENTER | Facility: HOME HEALTH | Age: 60
End: 2020-09-01

## 2021-11-09 ENCOUNTER — APPOINTMENT (OUTPATIENT)
Dept: OTOLARYNGOLOGY | Facility: CLINIC | Age: 61
End: 2021-11-09

## 2023-01-30 NOTE — DISCHARGE NOTE PROVIDER - HOSPITAL COURSE
59 F PMH breast ca s/p mastectomy and chemoRT, s/p TRAM flap (20yr ago), hx of cholelithiasis and nephrolithiasis managed medically w/ Bear Bile, presenting with two days of abdominal pain, nausea and emesis w/ leukocytosis and radiographic findings of mild gallbladder thickening and edema w/ mildly dilated bowel loops, admitted for acute cholecystitis vs gastroenteritis w/ possible chronic cholecystitis. The patient was taken for a HIDA scan but refused to undergo the test. The risks of refusing this exam were explained to her. The patient's white count downtrended to 9.6 and the patient requested to be discharged with outpatient follow up with her own primary care doctor. At the time of discharge the patient was stable, without pain and functioning at baseline. [FreeTextEntry1] : Routine blood work and urine today. STD screening. Pt advised to sign up for [facility] portal to review labs and communicate any questions or concerns directly. Yearly physical and return as needed for illness, medication refills, and new or existing complaints.

## 2024-01-10 ENCOUNTER — APPOINTMENT (OUTPATIENT)
Dept: OTOLARYNGOLOGY | Facility: CLINIC | Age: 64
End: 2024-01-10
Payer: COMMERCIAL

## 2024-01-10 VITALS
DIASTOLIC BLOOD PRESSURE: 96 MMHG | SYSTOLIC BLOOD PRESSURE: 143 MMHG | TEMPERATURE: 98 F | WEIGHT: 120 LBS | OXYGEN SATURATION: 95 % | BODY MASS INDEX: 19.29 KG/M2 | HEIGHT: 66 IN | HEART RATE: 101 BPM

## 2024-01-10 DIAGNOSIS — K13.0 DISEASES OF LIPS: ICD-10-CM

## 2024-01-10 DIAGNOSIS — H61.22 IMPACTED CERUMEN, LEFT EAR: ICD-10-CM

## 2024-01-10 PROCEDURE — 99213 OFFICE O/P EST LOW 20 MIN: CPT | Mod: 25

## 2024-01-10 PROCEDURE — 69210 REMOVE IMPACTED EAR WAX UNI: CPT | Mod: LT

## 2024-01-10 NOTE — ASSESSMENT
[FreeTextEntry1] : 1. angular cheilitis -Bacitracin placed 2.  left cerumen impaction -cerumen was removed under binocular microscopy with a combination of a suction and/or a loop curette. The patient tolerated the procedure well and there were no complications RTC as needed

## 2024-01-10 NOTE — HISTORY OF PRESENT ILLNESS
[de-identified] : Followup visit for this 62 y/o F with history of cerumen impaction and TMJ.  Lip crusting both corners

## 2024-01-10 NOTE — PHYSICAL EXAM
[Normal] : no rashes [de-identified] : left cerumen impaction- cleaned away with suction/curette with no issues. [de-identified] : dry and crusting Rx Abx cream

## 2024-06-19 NOTE — ED ADULT NURSE NOTE - CAS DISCH ACCOMP BY
Fabiola Cardona female   1945 78 y.o.   [id number]      Chief Complaint    Follow-up          [facility]  Fabiola Cardona is a 78 y.o.  female diagnosed 2014 with left breast left infiltrating ductal cancer. This was a 2 mm area of invasive cancer. lymph nodes were negative The original surgery was for ductal carcinoma in situ. Clinical stage was 0 DCIS It was ER/KS positive and HER-2/bakari-positive. She received radiation therapy. STAGEIa. She had a abnormal mammogram which on biopsy showed atypia on excisional biopsy 10/2016 showed a 1 cm area of DCIS. She had close margins.  She declined mastectomy which is standard treatment or have a reexcision for better margins. The DCIS was ER/KS negative. She was on Arimidex, and she took it for 4 years.     BREAST IMAGIN2023 bilateral diagnostic mammogram, BI-RADS Category 3, stable probable benign calcifications.     REPRODUCTIVE HISTORY: menarche age 12, , first birth age 17, menopause age 51, scattered fibroglandular breast tissue    FAMILY CANCER HISTORY:   Paternal cousin: breast cancer    REVIEW OF SYSTEMS    Constitutional:  Negative for appetite change, fatigue, fever and unexpected weight change.   HENT:  Negative for ear pain, hearing loss, nosebleeds, sore throat and trouble swallowing.    Eyes:  Negative for discharge, itching and visual disturbance.   Respiratory:  Negative for cough, chest tightness and shortness of breath.    Cardiovascular:  Negative for chest pain, palpitations and leg swelling.   Breast: as indicated in HPI  Gastrointestinal:  Negative for abdominal pain, constipation, diarrhea and nausea.   Endocrine: Negative for cold intolerance and heat intolerance.   Genitourinary:  Negative for dysuria, frequency, hematuria, pelvic pain and vaginal bleeding.   Musculoskeletal:  Negative for arthralgias, back pain, gait problem, joint swelling and myalgias.   Skin:  Negative for color change and rash.   Allergic/Immunologic:  Negative for environmental allergies and food allergies.   Neurological:  Negative for dizziness, tremors, speech difficulty, weakness, numbness and headaches.   Hematological:  Does not bruise/bleed easily.   Psychiatric/Behavioral:  Negative for agitation, dysphoric mood and sleep disturbance. The patient is not nervous/anxious.         MEDICATIONS  Current Outpatient Medications   Medication Instructions    amLODIPine (Norvasc) 5 mg tablet 1 tablet, oral, Daily    CALCIUM ORAL 1 tablet, oral, Daily, With food<BR>    cholecalciferol, vitamin D3, (VITAMIN D3 ORAL) 1 tablet, oral, Daily    glucos sul 2KCl/msm/chond/C/Mn (GLUCOSAMINE CHONDROITIN ORAL) As directed oral<BR>    lisinopril 40 mg, oral, Daily    MULTIVITAMIN ORAL 1 tablet, oral, Daily    turmeric/turmeric ext/pepr ext (turmeric-turmeric ext-pepper) 500-3 mg capsule oral    ubidecarenone (COQ-10 ORAL) 1 capsule, oral, Daily, With a meal        ALLERGIES  Allergies   Allergen Reactions    Calcitonin ([place]) Other     Nasal irriatation    Risedronate Other     Body aches      Sulfa (Sulfonamide Antibiotics) Unknown        SOCIAL HISTORY    Family History   Problem Relation Name Age of Onset    Colon cancer Mother Shanita     Heart disease Father      Diabetes Father      Other (HTN) Father      Other (suicide) Brother      Other (tonsillar cancer) Brother      Diabetes Other misc     Heart disease Other misc     Cancer Other misc     Other (HTN) Sibling      Other (chemical dependency) Sibling           Social History     Tobacco Use    Smoking status: Former     Current packs/day: 0.00     Types: Cigarettes     Quit date:      Years since quittin.5    Smokeless tobacco: Never   Substance Use Topics    Alcohol use: Yes     Alcohol/week: 7.0 standard drinks of alcohol     Types: 7 Glasses of wine per week        VITALS  Vitals:    24 0902   BP: 137/88   Pulse: 82        PHYSICAL EXAM  Patient is alert and oriented x3, with appropriate mood.  The gait is steady and hand grasps are equal. Sclera clear. The left breast with healed partial circumareolar incision with divot and incisional skin folding and left axillary incision. The right breast tissue is soft without palpable abnormalities, discrete nodules or masses. The skin is normal. There is no cervical, supraclavicular, or axillary lymphadenopathy palpable. Heart rate and rhythm normal, S1 and S2 appreciated. The lungs are clear bilaterally. Abdomen is soft & non-tender.    Physical Exam  Chest:              IMAGING  BI mammo bilateral diagnostic tomosynthesis 06/24/2024    Narrative  Interpreted By:  Valerio Becerra,  STUDY:  BI MAMMO BILATERAL DIAGNOSTIC TOMOSYNTHESIS;  6/24/2024 8:53 am    INDICATION:  One-year follow-up of probably benign left breast calcifications at  the lumpectomy site. Prior lumpectomy and radiation in 2016.    COMPARISON:  06/21/2023, 12/14/2022, 06/14/2022, 06/08/2021 and all relevant prior  breast imaging exams available at the time of dictation.    FINDINGS:  MAMMOGRAPHY: 2D and tomosynthesis images were reviewed at 1 mm slice  thickness.    Density:  There are areas of scattered fibroglandular tissue.    Postsurgical changes of lumpectomy with surgical clips, scarring with  skin and nipple retraction are again seen in subareolar left breast.  Similar coarse and round calcifications are seen with the lumpectomy  site, likely fat necrosis. No suspicious masses or calcifications are  identified in the right breast.    Impression  Stable probably benign left breast calcifications. Continued short  term follow up is recommended in 6 months.    BI-RADS Category:  3 Probably Benign.  Recommendation:  Short-term Interval Follow-up Imaging.  Recommended Date:  6 Months.  Laterality:  Left.          Time was spent viewing digital images of the radiology testing with the patient. I explained the results in depth, along with suggested explanation for follow up recommendations based  on the testing results.        ORDERS  Orders Placed This Encounter   Procedures    BI mammo left diagnostic     Standing Status:   Future     Standing Expiration Date:   8/24/2025     Order Specific Question:   Reason for exam:     Answer:   left calc     Order Specific Question:   Radiologist to Determine Optimal Study     Answer:   Yes     Order Specific Question:   Release result to MyChart     Answer:   Immediate [1]     Order Specific Question:   Is this exam part of a Research Study? If Yes, link this order to the research study     Answer:   No    BI mammo bilateral diagnostic     Standing Status:   Future     Standing Expiration Date:   8/24/2025     Order Specific Question:   Reason for exam:     Answer:   left calcs     Order Specific Question:   Radiologist to Determine Optimal Study     Answer:   Yes     Order Specific Question:   Release result to MyChart     Answer:   Immediate [1]     Order Specific Question:   Is this exam part of a Research Study? If Yes, link this order to the research study     Answer:   No         ASSESSMENT/PLAN  1. Breast calcification, left  BI mammo left diagnostic    Clinic Appointment Request    BI mammo bilateral diagnostic    Clinic Appointment Request      2. Encounter for follow-up surveillance of breast cancer             Follow up in about 1 year (around 6/24/2025), or with bilateral diagnostic mammogram. She understands the current recommendation of 6 month follow up mammogram, we discussed risks, benefit and alternatives of declining short term left diagnostic mammogram, she declines but will return in 1 year for bilateral diagnostic mammogram and office visit.       Ruma Mcgee, [facility]   Family/Self/RN